# Patient Record
Sex: FEMALE | Race: OTHER | NOT HISPANIC OR LATINO
[De-identification: names, ages, dates, MRNs, and addresses within clinical notes are randomized per-mention and may not be internally consistent; named-entity substitution may affect disease eponyms.]

---

## 2018-02-20 ENCOUNTER — TRANSCRIPTION ENCOUNTER (OUTPATIENT)
Age: 43
End: 2018-02-20

## 2018-02-20 PROBLEM — Z00.00 ENCOUNTER FOR PREVENTIVE HEALTH EXAMINATION: Status: ACTIVE | Noted: 2018-02-20

## 2018-03-06 ENCOUNTER — MOBILE ON CALL (OUTPATIENT)
Age: 43
End: 2018-03-06

## 2018-03-06 ENCOUNTER — LABORATORY RESULT (OUTPATIENT)
Age: 43
End: 2018-03-06

## 2018-03-06 ENCOUNTER — APPOINTMENT (OUTPATIENT)
Dept: DERMATOLOGY | Facility: CLINIC | Age: 43
End: 2018-03-06
Payer: COMMERCIAL

## 2018-03-06 VITALS
BODY MASS INDEX: 32.2 KG/M2 | DIASTOLIC BLOOD PRESSURE: 70 MMHG | HEIGHT: 62 IN | SYSTOLIC BLOOD PRESSURE: 120 MMHG | WEIGHT: 175 LBS

## 2018-03-06 DIAGNOSIS — D48.7 NEOPLASM OF UNCERTAIN BEHAVIOR OF OTHER SPECIFIED SITES: ICD-10-CM

## 2018-03-06 DIAGNOSIS — Z12.83 ENCOUNTER FOR SCREENING FOR MALIGNANT NEOPLASM OF SKIN: ICD-10-CM

## 2018-03-06 DIAGNOSIS — L73.8 OTHER SPECIFIED FOLLICULAR DISORDERS: ICD-10-CM

## 2018-03-06 DIAGNOSIS — L81.4 OTHER MELANIN HYPERPIGMENTATION: ICD-10-CM

## 2018-03-06 DIAGNOSIS — L82.1 OTHER SEBORRHEIC KERATOSIS: ICD-10-CM

## 2018-03-06 DIAGNOSIS — Z56.0 UNEMPLOYMENT, UNSPECIFIED: ICD-10-CM

## 2018-03-06 DIAGNOSIS — Z78.9 OTHER SPECIFIED HEALTH STATUS: ICD-10-CM

## 2018-03-06 DIAGNOSIS — D48.5 NEOPLASM OF UNCERTAIN BEHAVIOR OF SKIN: ICD-10-CM

## 2018-03-06 DIAGNOSIS — Z86.39 PERSONAL HISTORY OF OTHER ENDOCRINE, NUTRITIONAL AND METABOLIC DISEASE: ICD-10-CM

## 2018-03-06 DIAGNOSIS — Z87.898 PERSONAL HISTORY OF OTHER SPECIFIED CONDITIONS: ICD-10-CM

## 2018-03-06 DIAGNOSIS — Z80.8 FAMILY HISTORY OF MALIGNANT NEOPLASM OF OTHER ORGANS OR SYSTEMS: ICD-10-CM

## 2018-03-06 PROCEDURE — 11300 SHAVE SKIN LESION 0.5 CM/<: CPT

## 2018-03-06 PROCEDURE — 99204 OFFICE O/P NEW MOD 45 MIN: CPT | Mod: 25

## 2018-03-06 SDOH — ECONOMIC STABILITY - INCOME SECURITY: UNEMPLOYMENT, UNSPECIFIED: Z56.0

## 2018-04-10 ENCOUNTER — APPOINTMENT (OUTPATIENT)
Dept: PULMONOLOGY | Facility: CLINIC | Age: 43
End: 2018-04-10
Payer: COMMERCIAL

## 2018-04-10 VITALS
OXYGEN SATURATION: 98 % | DIASTOLIC BLOOD PRESSURE: 70 MMHG | HEART RATE: 94 BPM | HEIGHT: 61 IN | SYSTOLIC BLOOD PRESSURE: 110 MMHG | TEMPERATURE: 97.8 F | WEIGHT: 188 LBS | BODY MASS INDEX: 35.5 KG/M2 | RESPIRATION RATE: 16 BRPM

## 2018-04-10 DIAGNOSIS — R06.2 WHEEZING: ICD-10-CM

## 2018-04-10 DIAGNOSIS — J30.89 OTHER ALLERGIC RHINITIS: ICD-10-CM

## 2018-04-10 PROCEDURE — 94726 PLETHYSMOGRAPHY LUNG VOLUMES: CPT

## 2018-04-10 PROCEDURE — 94060 EVALUATION OF WHEEZING: CPT

## 2018-04-10 PROCEDURE — 94729 DIFFUSING CAPACITY: CPT

## 2018-04-10 PROCEDURE — 99205 OFFICE O/P NEW HI 60 MIN: CPT | Mod: 25

## 2018-04-10 PROCEDURE — ZZZZZ: CPT

## 2018-05-09 ENCOUNTER — APPOINTMENT (OUTPATIENT)
Dept: PULMONOLOGY | Facility: CLINIC | Age: 43
End: 2018-05-09
Payer: COMMERCIAL

## 2018-05-09 PROCEDURE — 95070 INHLJ BRNCL CHALLENGE TSTG: CPT

## 2018-05-09 PROCEDURE — 94070 EVALUATION OF WHEEZING: CPT

## 2018-05-15 ENCOUNTER — APPOINTMENT (OUTPATIENT)
Dept: PULMONOLOGY | Facility: CLINIC | Age: 43
End: 2018-05-15

## 2018-08-23 ENCOUNTER — FORM ENCOUNTER (OUTPATIENT)
Age: 43
End: 2018-08-23

## 2018-09-10 ENCOUNTER — APPOINTMENT (OUTPATIENT)
Dept: UROLOGY | Facility: CLINIC | Age: 43
End: 2018-09-10
Payer: COMMERCIAL

## 2018-09-10 VITALS
WEIGHT: 180 LBS | SYSTOLIC BLOOD PRESSURE: 117 MMHG | HEIGHT: 61 IN | HEART RATE: 67 BPM | BODY MASS INDEX: 33.99 KG/M2 | RESPIRATION RATE: 16 BRPM | DIASTOLIC BLOOD PRESSURE: 79 MMHG

## 2018-09-10 DIAGNOSIS — N39.3 STRESS INCONTINENCE (FEMALE) (MALE): ICD-10-CM

## 2018-09-10 PROCEDURE — 99203 OFFICE O/P NEW LOW 30 MIN: CPT

## 2018-11-20 ENCOUNTER — APPOINTMENT (OUTPATIENT)
Dept: PULMONOLOGY | Facility: CLINIC | Age: 43
End: 2018-11-20
Payer: COMMERCIAL

## 2018-11-20 VITALS
HEART RATE: 79 BPM | TEMPERATURE: 97.6 F | SYSTOLIC BLOOD PRESSURE: 121 MMHG | RESPIRATION RATE: 15 BRPM | DIASTOLIC BLOOD PRESSURE: 77 MMHG | OXYGEN SATURATION: 95 % | HEIGHT: 61 IN

## 2018-11-20 DIAGNOSIS — E66.01 MORBID (SEVERE) OBESITY DUE TO EXCESS CALORIES: ICD-10-CM

## 2018-11-20 DIAGNOSIS — K21.9 GASTRO-ESOPHAGEAL REFLUX DISEASE W/OUT ESOPHAGITIS: ICD-10-CM

## 2018-11-20 PROCEDURE — 99214 OFFICE O/P EST MOD 30 MIN: CPT

## 2019-01-22 ENCOUNTER — APPOINTMENT (OUTPATIENT)
Dept: PULMONOLOGY | Facility: CLINIC | Age: 44
End: 2019-01-22
Payer: COMMERCIAL

## 2019-01-22 VITALS
TEMPERATURE: 97.5 F | SYSTOLIC BLOOD PRESSURE: 106 MMHG | DIASTOLIC BLOOD PRESSURE: 71 MMHG | RESPIRATION RATE: 15 BRPM | WEIGHT: 185 LBS | BODY MASS INDEX: 34.04 KG/M2 | OXYGEN SATURATION: 98 % | HEIGHT: 62 IN | HEART RATE: 76 BPM

## 2019-01-22 DIAGNOSIS — R06.02 SHORTNESS OF BREATH: ICD-10-CM

## 2019-01-22 DIAGNOSIS — Z87.09 PERSONAL HISTORY OF OTHER DISEASES OF THE RESPIRATORY SYSTEM: ICD-10-CM

## 2019-01-22 PROCEDURE — 99214 OFFICE O/P EST MOD 30 MIN: CPT

## 2019-01-22 RX ORDER — ALBUTEROL SULFATE 2.5 MG/3ML
(2.5 MG/3ML) SOLUTION RESPIRATORY (INHALATION)
Qty: 60 | Refills: 2 | Status: COMPLETED | COMMUNITY
Start: 2019-01-22 | End: 2019-03-08

## 2019-01-22 NOTE — DISCUSSION/SUMMARY
[FreeTextEntry1] : EMBER: Increased  CPAP 5 to 7 cm H20.\par Chronic Rhinitis. Suggest [facility] sinus rinse 1-2x daily.\par \par Pt does not have asthma. Methacholine challenge test negative.\par \par f/u 3-6 months.

## 2019-01-22 NOTE — HISTORY OF PRESENT ILLNESS
[FreeTextEntry1] : Ms. Proctor comes for f/u for sleep apnea. Her CPAP is set at the wrong setting of 5 cm H20 instead of & cm H20 and she notices the difference.She says she is not sleeping well.\par \par No difficulty breathing. Stopped Flonase and Claritin because made her nose too dry.

## 2019-01-22 NOTE — REVIEW OF SYSTEMS
[Nasal Congestion] : nasal congestion [Postnasal Drip] : postnasal drip [As Noted in HPI] : as noted in HPI [Negative] : Neurologic

## 2019-01-22 NOTE — PHYSICAL EXAM
[Normal Conjunctiva] : the conjunctiva exhibited no abnormalities [Eyelids - No Xanthelasma] : the eyelids demonstrated no xanthelasmas [Normal Oropharynx] : abnormal oropharynx [Low Lying Soft Palate] : low lying soft palate [Elongated Uvula] : no elongated uvula [Enlarged Base of the Tongue] : no enlargement of the base of the tongue [Erythema] : no erythema of the pharynx [Retrognathia] : no retrognathia [Micrognathia] : no micrognathia [III] : III [Neck Appearance] : the appearance of the neck was normal [Neck Cervical Mass (___cm)] : no neck mass was observed [Jugular Venous Distention Increased] : there was no jugular-venous distention [Thyroid Diffuse Enlargement] : the thyroid was not enlarged [Thyroid Nodule] : there were no palpable thyroid nodules [Heart Rate And Rhythm] : heart rate and rhythm were normal [Heart Sounds] : normal S1 and S2 [Murmurs] : no murmurs present [Respiration, Rhythm And Depth] : normal respiratory rhythm and effort [Exaggerated Use Of Accessory Muscles For Inspiration] : no accessory muscle use [Auscultation Breath Sounds / Voice Sounds] : lungs were clear to auscultation bilaterally [Abdomen Soft] : soft [Abdomen Tenderness] : non-tender [Abdomen Mass (___ Cm)] : no abdominal mass palpated [FreeTextEntry1] : obese [Abnormal Walk] : normal gait [Gait - Sufficient For Exercise Testing] : the gait was sufficient for exercise testing [Nail Clubbing] : no clubbing of the fingernails [Cyanosis, Localized] : no localized cyanosis [Petechial Hemorrhages (___cm)] : no petechial hemorrhages [Skin Color & Pigmentation] : normal skin color and pigmentation [] : no rash [No Venous Stasis] : no venous stasis [Skin Lesions] : no skin lesions [No Skin Ulcers] : no skin ulcer [No Xanthoma] : no  xanthoma was observed [No Focal Deficits] : no focal deficits [Oriented To Time, Place, And Person] : oriented to person, place, and time [Impaired Insight] : insight and judgment were intact [Affect] : the affect was normal

## 2019-03-12 ENCOUNTER — APPOINTMENT (OUTPATIENT)
Dept: PULMONOLOGY | Facility: CLINIC | Age: 44
End: 2019-03-12
Payer: COMMERCIAL

## 2019-03-12 VITALS
SYSTOLIC BLOOD PRESSURE: 105 MMHG | RESPIRATION RATE: 16 BRPM | HEART RATE: 77 BPM | TEMPERATURE: 97.7 F | OXYGEN SATURATION: 98 % | DIASTOLIC BLOOD PRESSURE: 70 MMHG

## 2019-03-12 DIAGNOSIS — Z99.89 OBSTRUCTIVE SLEEP APNEA (ADULT) (PEDIATRIC): ICD-10-CM

## 2019-03-12 DIAGNOSIS — G47.33 OBSTRUCTIVE SLEEP APNEA (ADULT) (PEDIATRIC): ICD-10-CM

## 2019-03-12 DIAGNOSIS — J31.0 CHRONIC RHINITIS: ICD-10-CM

## 2019-03-12 PROCEDURE — 99213 OFFICE O/P EST LOW 20 MIN: CPT

## 2019-03-12 NOTE — PHYSICAL EXAM
[General Appearance - Well Developed] : well developed [Normal Appearance] : normal appearance [Well Groomed] : well groomed [General Appearance - Well Nourished] : well nourished [No Deformities] : no deformities [General Appearance - In No Acute Distress] : no acute distress [Normal Conjunctiva] : the conjunctiva exhibited no abnormalities [Eyelids - No Xanthelasma] : the eyelids demonstrated no xanthelasmas [Normal Oropharynx] : normal oropharynx [Neck Appearance] : the appearance of the neck was normal [Neck Cervical Mass (___cm)] : no neck mass was observed [Jugular Venous Distention Increased] : there was no jugular-venous distention [Thyroid Diffuse Enlargement] : the thyroid was not enlarged [Thyroid Nodule] : there were no palpable thyroid nodules [Heart Rate And Rhythm] : heart rate and rhythm were normal [Heart Sounds] : normal S1 and S2 [Murmurs] : no murmurs present [Respiration, Rhythm And Depth] : normal respiratory rhythm and effort [Exaggerated Use Of Accessory Muscles For Inspiration] : no accessory muscle use [Auscultation Breath Sounds / Voice Sounds] : lungs were clear to auscultation bilaterally [Abdomen Soft] : soft [Abdomen Tenderness] : non-tender [Abdomen Mass (___ Cm)] : no abdominal mass palpated [Abnormal Walk] : normal gait [Gait - Sufficient For Exercise Testing] : the gait was sufficient for exercise testing [Nail Clubbing] : no clubbing of the fingernails [Cyanosis, Localized] : no localized cyanosis [Petechial Hemorrhages (___cm)] : no petechial hemorrhages [Skin Color & Pigmentation] : normal skin color and pigmentation [Skin Turgor] : normal skin turgor [] : no rash [No Focal Deficits] : no focal deficits [Oriented To Time, Place, And Person] : oriented to person, place, and time [Impaired Insight] : insight and judgment were intact [Affect] : the affect was normal [FreeTextEntry1] : overweight female in NAD

## 2019-03-12 NOTE — HISTORY OF PRESENT ILLNESS
[FreeTextEntry1] : Ms. Proctor is here for f/u for sleep apnea. She is sleeping better with CPAP of 7 cm H20.\par However she says  the straps for her mask hurt her ears. In addition she broke her the main hose of the machine.\par \par She continues to have post nasal drip. She has not used the [facility] sinus rinse. She has not used  her Flonase recently because of nose bleed.

## 2019-03-12 NOTE — DISCUSSION/SUMMARY
[FreeTextEntry1] : 1. OSAS. Continue CPAP 7cm H20.  Orders  given for new hose and strap fitting for mask.\par 2. Chronic Rhinitis. Flonase 1 squirt every other day. Encourage to try sinus rinse.

## 2019-03-12 NOTE — REVIEW OF SYSTEMS
[Nasal Congestion] : nasal congestion [Postnasal Drip] : postnasal drip [As Noted in HPI] : as noted in HPI [Negative] : Pulmonary Hypertension

## 2019-08-14 ENCOUNTER — APPOINTMENT (OUTPATIENT)
Dept: ORTHOPEDIC SURGERY | Facility: CLINIC | Age: 44
End: 2019-08-14
Payer: MEDICARE

## 2019-08-14 VITALS
BODY MASS INDEX: 33.13 KG/M2 | SYSTOLIC BLOOD PRESSURE: 104 MMHG | HEART RATE: 80 BPM | WEIGHT: 180 LBS | HEIGHT: 62 IN | DIASTOLIC BLOOD PRESSURE: 72 MMHG

## 2019-08-14 DIAGNOSIS — Z82.61 FAMILY HISTORY OF ARTHRITIS: ICD-10-CM

## 2019-08-14 DIAGNOSIS — M65.331 TRIGGER FINGER, RIGHT MIDDLE FINGER: ICD-10-CM

## 2019-08-14 DIAGNOSIS — Z78.9 OTHER SPECIFIED HEALTH STATUS: ICD-10-CM

## 2019-08-14 DIAGNOSIS — Z80.9 FAMILY HISTORY OF MALIGNANT NEOPLASM, UNSPECIFIED: ICD-10-CM

## 2019-08-14 DIAGNOSIS — M65.841 OTHER SYNOVITIS AND TENOSYNOVITIS, RIGHT HAND: ICD-10-CM

## 2019-08-14 PROCEDURE — 99203 OFFICE O/P NEW LOW 30 MIN: CPT | Mod: 25

## 2019-08-14 PROCEDURE — 20551 NJX 1 TENDON ORIGIN/INSJ: CPT | Mod: LT

## 2019-08-14 PROCEDURE — 20550 NJX 1 TENDON SHEATH/LIGAMENT: CPT | Mod: F7

## 2019-08-17 RX ORDER — ROSUVASTATIN CALCIUM 5 MG/1
TABLET, FILM COATED ORAL
Refills: 0 | Status: ACTIVE | COMMUNITY

## 2019-08-17 RX ORDER — PANTOPRAZOLE SODIUM 40 MG/1
40 GRANULE, DELAYED RELEASE ORAL
Refills: 0 | Status: ACTIVE | COMMUNITY

## 2019-08-17 RX ORDER — LEVOTHYROXINE SODIUM 0.17 MG/1
TABLET ORAL
Refills: 0 | Status: ACTIVE | COMMUNITY

## 2019-09-10 ENCOUNTER — APPOINTMENT (OUTPATIENT)
Dept: HUMAN REPRODUCTION | Facility: CLINIC | Age: 44
End: 2019-09-10

## 2020-07-05 ENCOUNTER — TRANSCRIPTION ENCOUNTER (OUTPATIENT)
Age: 45
End: 2020-07-05

## 2020-10-21 ENCOUNTER — APPOINTMENT (OUTPATIENT)
Dept: ORTHOPEDIC SURGERY | Facility: CLINIC | Age: 45
End: 2020-10-21
Payer: MEDICARE

## 2020-10-21 VITALS — WEIGHT: 180 LBS | HEIGHT: 62 IN | BODY MASS INDEX: 33.13 KG/M2

## 2020-10-21 DIAGNOSIS — S83.232A COMPLEX TEAR OF MEDIAL MENISCUS, CURRENT INJURY, LEFT KNEE, INITIAL ENCOUNTER: ICD-10-CM

## 2020-10-21 DIAGNOSIS — M23.91 UNSPECIFIED INTERNAL DERANGEMENT OF RIGHT KNEE: ICD-10-CM

## 2020-10-21 DIAGNOSIS — M25.562 PAIN IN LEFT KNEE: ICD-10-CM

## 2020-10-21 DIAGNOSIS — M17.11 UNILATERAL PRIMARY OSTEOARTHRITIS, RIGHT KNEE: ICD-10-CM

## 2020-10-21 PROCEDURE — 99072 ADDL SUPL MATRL&STAF TM PHE: CPT

## 2020-10-21 PROCEDURE — 99204 OFFICE O/P NEW MOD 45 MIN: CPT

## 2020-10-21 NOTE — CONSULT LETTER
[Dear  ___] : Dear  [unfilled], [Consult Letter:] : I had the pleasure of evaluating your patient, [unfilled]. [Please see my note below.] : Please see my note below. [Consult Closing:] : Thank you very much for allowing me to participate in the care of this patient.  If you have any questions, please do not hesitate to contact me. [Sincerely,] : Sincerely, [FreeTextEntry2] : Katia Alegria [FreeTextEntry3] : Scot Hernandez MD\par

## 2020-10-21 NOTE — DISCUSSION/SUMMARY
[de-identified] : Impression;\par -Rule out tear medial meniscus left knee\par -Osteoarthritis medial compartment left knee\par Plan;\par -Given young age MRI to stage extent of disease medial compartment

## 2020-10-21 NOTE — PHYSICAL EXAM
[de-identified] : Constitutional:Well nourished , well developed and in no acute distress\par Psychiatric: Alert and oriented to time place and person.Appropriate affect\par Respiratory: Unlabored respirations,no audible wheezing\par Cardiovascular: no leg swelling  ankle edema\par Vascular: no calf or thigh tenderness, \par Peripheral pulses; intact\par Skin:Head, neck, arms and lower extremities:no lesions or discoloration\par Lymphatics:No groin adenopathy\par Neurological: intact light touch sensation and grossly intact coordination and motor power.\par Knee; left knee; no effusion flexion 0/1:30 ligaments intact tenderness medial joint line [de-identified] : X-rays left knee of October 13, 2020 8 PM lateral demonstrate medial compartment marginal osteophytes small subchondral cyst

## 2020-10-21 NOTE — HISTORY OF PRESENT ILLNESS
[de-identified] : 44-year-old female disabled presents complaining of pain left knee. A few years ago developed spontaneous onset of intermittent left knee medial parapatellar pain. Recently symptoms have been increasing in severity pain provoked by going from sitting to standing and ambulation. Sensation described as "twisting" with duration of pain from minutes to hours. Denies swelling locking or giving out. Past past surgical history of multiple lumbar spine surgeries and a MRI compatible spinal cord stimulator

## 2020-11-02 ENCOUNTER — OUTPATIENT (OUTPATIENT)
Dept: OUTPATIENT SERVICES | Facility: HOSPITAL | Age: 45
LOS: 1 days | End: 2020-11-02
Payer: COMMERCIAL

## 2020-11-02 ENCOUNTER — APPOINTMENT (OUTPATIENT)
Dept: MRI IMAGING | Facility: CLINIC | Age: 45
End: 2020-11-02
Payer: MEDICARE

## 2020-11-02 DIAGNOSIS — M25.562 PAIN IN LEFT KNEE: ICD-10-CM

## 2020-11-02 PROCEDURE — 73721 MRI JNT OF LWR EXTRE W/O DYE: CPT | Mod: 26,LT

## 2020-11-02 PROCEDURE — 73721 MRI JNT OF LWR EXTRE W/O DYE: CPT

## 2020-11-04 ENCOUNTER — APPOINTMENT (OUTPATIENT)
Dept: ORTHOPEDIC SURGERY | Facility: CLINIC | Age: 45
End: 2020-11-04
Payer: MEDICARE

## 2020-11-04 DIAGNOSIS — M17.12 UNILATERAL PRIMARY OSTEOARTHRITIS, LEFT KNEE: ICD-10-CM

## 2020-11-04 PROCEDURE — 99072 ADDL SUPL MATRL&STAF TM PHE: CPT

## 2020-11-04 PROCEDURE — 99213 OFFICE O/P EST LOW 20 MIN: CPT

## 2020-11-04 NOTE — HISTORY OF PRESENT ILLNESS
[de-identified] : Followup pain left knee. A few years ago developed spontaneous onset of intermittent left knee medial parapatellar pain. Recently symptoms have been increasing in severity pain provoked by going from sitting to standing and ambulation. Sensation described as "twisting" with duration of pain from minutes to hours. Denies swelling locking or giving out. Past past surgical history of multiple lumbar spine surgeries and a MRI compatible spinal cord stimulator. Patient is here today for MRI review of the left knee.

## 2020-11-04 NOTE — PHYSICAL EXAM
[de-identified] : Constitutional:Well nourished , well developed and in no acute distress\par Psychiatric: Alert and oriented to time place and person.Appropriate affect\par Respiratory: Unlabored respirations,no audible wheezing\par Cardiovascular: no leg swelling  ankle edema\par Vascular: no calf or thigh tenderness, \par Peripheral pulses; intact\par Skin:Head, neck, arms and lower extremities:no lesions or discoloration\par Lymphatics:No groin adenopathy\par Neurological: intact light touch sensation and grossly intact coordination and motor power.\par Knee; left knee; no effusion flexion 0/1:30 ligaments intact tenderness medial joint line [de-identified] : o MRI of the left knee performed on 11/02/2020 at [facility]: impression: \par ¦ Focal areas of high-grade cartilage loss at the medial lateral femoral condyles.\par ¦ Mild chondral wear at the central patella.

## 2020-11-04 NOTE — DISCUSSION/SUMMARY
[de-identified] : MRI of the left knee was reviewed and discussed in great detail today. \par \par A prescription for Physical Therapy was provided.\par \par  A referral was provided for Dr Aceves for evaluation and possible viscosupplementation. Advised patient to follow up with her in 1 month.

## 2020-12-08 ENCOUNTER — APPOINTMENT (OUTPATIENT)
Dept: ORTHOPEDIC SURGERY | Facility: CLINIC | Age: 45
End: 2020-12-08
Payer: MEDICARE

## 2020-12-08 DIAGNOSIS — M17.12 UNILATERAL PRIMARY OSTEOARTHRITIS, LEFT KNEE: ICD-10-CM

## 2020-12-08 PROCEDURE — 99204 OFFICE O/P NEW MOD 45 MIN: CPT

## 2020-12-08 PROCEDURE — 99072 ADDL SUPL MATRL&STAF TM PHE: CPT

## 2020-12-08 RX ORDER — HYALURONATE SODIUM, STABILIZED 88 MG/4 ML
88 SYRINGE (ML) INTRAARTICULAR
Qty: 1 | Refills: 0 | Status: ACTIVE | COMMUNITY
Start: 2020-12-08

## 2020-12-12 PROBLEM — M17.12 ARTHRITIS OF KNEE, LEFT: Status: ACTIVE | Noted: 2020-11-04

## 2021-05-05 ENCOUNTER — RESULT REVIEW (OUTPATIENT)
Age: 46
End: 2021-05-05

## 2021-08-19 ENCOUNTER — TRANSCRIPTION ENCOUNTER (OUTPATIENT)
Age: 46
End: 2021-08-19

## 2022-01-12 ENCOUNTER — APPOINTMENT (OUTPATIENT)
Dept: PULMONOLOGY | Facility: CLINIC | Age: 47
End: 2022-01-12
Payer: MEDICARE

## 2022-01-12 DIAGNOSIS — E61.1 IRON DEFICIENCY: ICD-10-CM

## 2022-01-12 PROCEDURE — 99204 OFFICE O/P NEW MOD 45 MIN: CPT | Mod: 95

## 2022-01-12 RX ORDER — PANTOPRAZOLE SODIUM 40 MG/1
GRANULE, DELAYED RELEASE ORAL
Refills: 0 | Status: DISCONTINUED | COMMUNITY
End: 2022-01-12

## 2022-01-12 RX ORDER — LEVOTHYROXINE SODIUM 137 UG/1
TABLET ORAL
Refills: 0 | Status: DISCONTINUED | COMMUNITY
End: 2022-01-12

## 2022-01-12 RX ORDER — SEMAGLUTIDE 1.34 MG/ML
2 INJECTION, SOLUTION SUBCUTANEOUS
Refills: 0 | Status: ACTIVE | COMMUNITY

## 2022-01-12 RX ORDER — GLIMEPIRIDE 4 MG/1
TABLET ORAL
Refills: 0 | Status: DISCONTINUED | COMMUNITY
End: 2022-01-12

## 2022-01-12 RX ORDER — SITAGLIPTIN 100 MG/1
TABLET, FILM COATED ORAL
Refills: 0 | Status: DISCONTINUED | COMMUNITY
End: 2022-01-12

## 2022-01-12 NOTE — REVIEW OF SYSTEMS
[EDS: ESS=____] : daytime somnolence: ESS=[unfilled] [Lower Extremity Discomfort] : lower extremity discomfort [Negative] : Psychiatric

## 2022-01-12 NOTE — HISTORY OF PRESENT ILLNESS
[Obstructive Sleep Apnea] : obstructive sleep apnea [Snoring] : snoring [Frequent Nocturnal Awakening] : frequent nocturnal awakening [Unintentional Sleep While Inactive] : unintentional sleep while inactive [Awakes Unrefreshed] : awakening unrefreshed [Awakes with Headache] : headache upon awakening [Awakening With Dry Mouth] : awakening with dry mouth [Lower Extremity Discomfort] : lower extremity discomfort in evening or at bedtime [To Bed: ___] : ~he/she~ goes to bed at [unfilled] [Arises: ___] : arises at [unfilled] [Sleep Onset Latency: ___ minutes] : sleep onset latency of [unfilled] minutes reported [Nocturnal Awakenings: ___] : ~he/she~ typically has [unfilled] nocturnal awakenings [TST: ___] : Total sleep time is [unfilled] [Home] : at home, [unfilled] , at the time of the visit. [Medical Office: ([facility])___] : at the medical office located in  [Verbal consent obtained from patient] : the patient, [unfilled] [FreeTextEntry1] : 46 year old female with with moderate sleep apnea previously on CPAP here to re-establish care.\par \par PSG (1/14/2016) AHI 19.9. CPAP titration (2/3/2016) showed an optimal pressure of 7 cm H2O. Latest CPAP titration (8/24/2018) showed an optimal pressure of 5 cm H2O but settings most recently adjusted back up to 7 cm H2O. She has a Dreamstation 1 - setup 1/7/19). She halted therapy as she has been unable to get supplies since April 2021 and recently found out from her [employer] that she needs an updated script for her supplies.She has loud snoring and frequent night time awakenings with daytime sleepiness (ESS - 13), which were the symptoms that led to her PSG in 2016. Most recent compliance report available from 2019 shows normalized AHI of 1.9 on CPAP of 7 cm H2O. Notably she also has uncomfortable sensation in her legs that prevent her from sleeping at night. This is relieved by walking.\par \par Co-morbidities: DM, GERD, Hypothyroid\par \par EPWORTH SLEEPINESS SCALE\par \par Chance of dozing.............Situation\par .....................2...................Sitting and reading\par .....................3...................Watching TV\par .....................0...................Sitting inactive in a public place (eg a theatre or a meeting)\par .....................3...................As a passenger in a car for an hour without a break\par .....................3...................Lying down to rest in the afternoon when circumstances permit\par .....................0...................Sitting and talking to someone\par .....................2...................Sitting quietly after lunch without alcohol\par .....................0...................In a car, while stopped for a few minutes in traffic\par \par .....................13...................TOTAL SCORE\par \par 0 = Never would doze\par 1 = Slight chance of dozing\par 2 = Moderate chance of dozing\par 3 = High chance of dozing\par  [Witnessed Apneas] : no witnessed sleep apnea [Unintentional Sleep while Active] : no unintentional sleep while active [Recent  Weight Gain] : no recent weight gain [Unusual Sleep Behavior] : no unusual sleep behavior [Sleep Paralysis] : no sleep paralysis [ESS] : 13

## 2022-01-12 NOTE — ASSESSMENT
[FreeTextEntry1] : 45yo F with moderate sleep apnea (AHI 19.9 in 2016) on CPAP (Dream Station - setup 1/7/19) presenting to resume care for her EMBER\par \par 1) EMBER - Moderate severity as of 2016 with an AHI of 19.9. Subsequent CPAP titration showed normalized AHI to 1.9 with a CPAP of 7cm H2O, which is also reflected by her most recent therapy data. We will update her script and request new supplies from her [employer] company. We have also discussed the [employer] recall for her DreamStation and urged her to register her device on the [employer] website. Given her symptomatic relief from CPAP use, she benefits from its use and should continue with her therapy once she receives new supplies. The ramifications of obstructive sleep apnea and its potential therapeutic modalities were discussed with the patient. The dangers of drowsy driving were discussed with the patient. The patient was warned to avoid drowsy driving.\par \par 2) RLS - Patient has uncomfortable sensations in her legs prior to sleeping at night, which is relieved with walking and prevents her from falling asleep at night. This is consistent with RLS. While she has no history of anemia, we will check her iron levels, Iron sat, and ferritin to see if she would potentially benefit from iron supplementation. We will follow up with her once these lab results are available.

## 2022-01-28 ENCOUNTER — NON-APPOINTMENT (OUTPATIENT)
Age: 47
End: 2022-01-28

## 2022-01-28 LAB
FERRITIN SERPL-MCNC: 95 NG/ML
IRON SATN MFR SERPL: 36 %
IRON SERPL-MCNC: 63 UG/DL
TIBC SERPL-MCNC: 172 UG/DL
TRANSFERRIN SERPL-MCNC: 141 MG/DL
UIBC SERPL-MCNC: 109 UG/DL

## 2022-04-25 ENCOUNTER — APPOINTMENT (OUTPATIENT)
Dept: ORTHOPEDIC SURGERY | Facility: CLINIC | Age: 47
End: 2022-04-25

## 2022-04-26 ENCOUNTER — APPOINTMENT (OUTPATIENT)
Dept: ORTHOPEDIC SURGERY | Facility: CLINIC | Age: 47
End: 2022-04-26
Payer: MEDICARE

## 2022-04-26 VITALS — WEIGHT: 170 LBS | HEIGHT: 62 IN | BODY MASS INDEX: 31.28 KG/M2

## 2022-04-26 DIAGNOSIS — M77.12 LATERAL EPICONDYLITIS, LEFT ELBOW: ICD-10-CM

## 2022-04-26 PROCEDURE — 99213 OFFICE O/P EST LOW 20 MIN: CPT

## 2022-04-26 NOTE — HISTORY OF PRESENT ILLNESS
[10] : 10 [5] : 5 [Dull/Aching] : dull/aching [de-identified] : MRI shows atrophy at the anconeus; mild inflammation  [FreeTextEntry1] : left elbow [de-identified] : massage

## 2022-05-09 ENCOUNTER — APPOINTMENT (OUTPATIENT)
Dept: PULMONOLOGY | Facility: CLINIC | Age: 47
End: 2022-05-09
Payer: MEDICARE

## 2022-05-09 DIAGNOSIS — G25.81 RESTLESS LEGS SYNDROME: ICD-10-CM

## 2022-05-09 PROCEDURE — 99213 OFFICE O/P EST LOW 20 MIN: CPT | Mod: 95

## 2022-05-09 NOTE — HISTORY OF PRESENT ILLNESS
[Home] : at home, [unfilled] , at the time of the visit. [Medical Office: ([facility])___] : at the medical office located in  [Verbal consent obtained from patient] : the patient, [unfilled] [FreeTextEntry1] : 46 year old female with with moderate sleep apnea here for follow-up visit.\par \par Comorbidities include diabetes, GERD, hypothyroidism.  \par \par PSG (1/14/2016) AHI 19.9. CPAP titration (2/3/2016) showed an optimal pressure of 7 cm H2O. Latest CPAP titration (8/24/2018) showed an optimal pressure of 5 cm H2O but settings most recently adjusted back up to 7 cm H2O. She has a Dreamstation 1 - setup 1/7/19). She halted therapy as she has been unable to get supplies since April 2021.  After establishing care with me back in January, new prescription for supplies was ordered but she still has not received.  She is unsure if she registered her CPAP but believes she has.  RLS symptoms are overall mild and she feels she does not require treatment.\par

## 2022-05-09 NOTE — ASSESSMENT
[FreeTextEntry1] : 47 yo F with moderate sleep apnea on CPAP (Dream Station - setup 1/7/19) here for follow-up visit.\par \par She has been unable to resume therapy because supplies have not been received to date.  [facility] and have them send supplies.  She was advised to check the [facility] website to see if her DreamStation has been registered.  We will hold off on treatment for RLS as symptoms are overall mild. \par  Follow-up in 3 months for data download.\par \par

## 2022-05-18 ENCOUNTER — APPOINTMENT (OUTPATIENT)
Dept: NEUROLOGY | Facility: CLINIC | Age: 47
End: 2022-05-18
Payer: MEDICARE

## 2022-05-18 DIAGNOSIS — M79.632 PAIN IN LEFT FOREARM: ICD-10-CM

## 2022-05-18 DIAGNOSIS — R20.2 ANESTHESIA OF SKIN: ICD-10-CM

## 2022-05-18 DIAGNOSIS — G56.02 CARPAL TUNNEL SYNDROME, LEFT UPPER LIMB: ICD-10-CM

## 2022-05-18 DIAGNOSIS — R20.0 ANESTHESIA OF SKIN: ICD-10-CM

## 2022-05-18 DIAGNOSIS — M54.12 RADICULOPATHY, CERVICAL REGION: ICD-10-CM

## 2022-05-18 DIAGNOSIS — G56.22 LESION OF ULNAR NERVE, LEFT UPPER LIMB: ICD-10-CM

## 2022-05-18 PROCEDURE — 95911 NRV CNDJ TEST 9-10 STUDIES: CPT

## 2022-05-18 PROCEDURE — 95886 MUSC TEST DONE W/N TEST COMP: CPT

## 2022-05-24 ENCOUNTER — APPOINTMENT (OUTPATIENT)
Dept: ORTHOPEDIC SURGERY | Facility: CLINIC | Age: 47
End: 2022-05-24
Payer: MEDICARE

## 2022-05-24 VITALS — WEIGHT: 170 LBS | BODY MASS INDEX: 31.28 KG/M2 | HEIGHT: 62 IN

## 2022-05-24 DIAGNOSIS — M54.2 CERVICALGIA: ICD-10-CM

## 2022-05-24 DIAGNOSIS — M54.12 RADICULOPATHY, CERVICAL REGION: ICD-10-CM

## 2022-05-24 PROCEDURE — 99213 OFFICE O/P EST LOW 20 MIN: CPT

## 2022-05-24 NOTE — HISTORY OF PRESENT ILLNESS
[10] : 10 [5] : 5 [Dull/Aching] : dull/aching [Sharp] : sharp [Tingling] : tingling [de-identified] : EMG shows cervical radic [FreeTextEntry1] : left Hand  [FreeTextEntry5] : pain is the same since last visit  [de-identified] : NO

## 2022-08-10 ENCOUNTER — APPOINTMENT (OUTPATIENT)
Dept: PULMONOLOGY | Facility: CLINIC | Age: 47
End: 2022-08-10

## 2022-08-10 PROCEDURE — 99213 OFFICE O/P EST LOW 20 MIN: CPT | Mod: 95

## 2022-08-10 NOTE — HISTORY OF PRESENT ILLNESS
[FreeTextEntry1] : 46 year old female with with moderate sleep apnea here for follow-up visit.\par \par Comorbidities include diabetes, GERD, hypothyroidism.  \par \par PSG (1/14/2016) AHI 19.9. \par CPAP titration (2/3/2016) showed an optimal pressure of 7 cm H2O. \par Latest CPAP titration (8/24/2018) showed an optimal pressure of 5 cm H2O but settings most recently adjusted back up to 7 cm H2O. \par \par Device: Dreamstation 1 (setup 1/7/19). She halted therapy as she has been unable to get supplies since April 2021.  Established care with me January 2022. New supplies finally received last week. She has resumed CPAP use but feels that the pressure is insufficient. Device is registered through [employer].  [Home] : at home, [unfilled] , at the time of the visit. [Medical Office: ([facility])___] : at the medical office located in  [Verbal consent obtained from patient] : the patient, [unfilled]

## 2022-08-10 NOTE — ASSESSMENT
[FreeTextEntry1] : 46 year old female with moderate sleep apnea on CPAP here for follow-up visit.\par She has resumed therapy but feels pressure is insufficient. Data is not available on Care .\par Contacted [facility] to have modem reset. \par \par Follow-up in 2-3 months for data download.\par \par

## 2022-10-18 ENCOUNTER — APPOINTMENT (OUTPATIENT)
Dept: PULMONOLOGY | Facility: CLINIC | Age: 47
End: 2022-10-18

## 2022-10-18 DIAGNOSIS — G47.33 OBSTRUCTIVE SLEEP APNEA (ADULT) (PEDIATRIC): ICD-10-CM

## 2022-10-18 PROCEDURE — 99214 OFFICE O/P EST MOD 30 MIN: CPT | Mod: 95

## 2022-10-18 RX ORDER — FLUTICASONE PROPIONATE AND SALMETEROL 100; 50 UG/1; UG/1
100-50 POWDER RESPIRATORY (INHALATION) TWICE DAILY
Qty: 1 | Refills: 0 | Status: ACTIVE | COMMUNITY
Start: 2022-10-18 | End: 1900-01-01

## 2022-10-18 NOTE — ASSESSMENT
[FreeTextEntry1] : 46 year old female with moderate sleep apnea on CPAP here for follow-up visit.\par Therapeutic and compliance data download reveals usage 83.3% of days with an average use of 5 hours and 55 minutes. Therapy based AHI is 1.9/hr on 7 cm H2O. The patient is experiencing benefit from CPAP and should continue to use. Will increase pressure to 9 cm H2O as she reports insufficient pressures. \par Rx for Advair will be sent to pharmacy. Will repeat PFT next visit.\par \par \par She will follow up in 3 months for in-person visit and PFTs.\par \par  \par \par

## 2022-10-18 NOTE — HISTORY OF PRESENT ILLNESS
[Home] : at home, [unfilled] , at the time of the visit. [Medical Office: ([facility])___] : at the medical office located in  [Verbal consent obtained from patient] : the patient, [unfilled] [FreeTextEntry1] : 46 year old female with with moderate sleep apnea here for follow-up visit.\par \par Comorbidities include diabetes, GERD, hypothyroidism.  \par \par PSG (1/14/2016) AHI 19.9. \par CPAP titration (2/3/2016) showed an optimal pressure of 7 cm H2O. \par Latest CPAP titration (8/24/2018) showed an optimal pressure of 5 cm H2O but settings most recently adjusted back up to 7 cm H2O. \par \par DME: [facility]\par Device: Dreamstation 1 (setup 1/7/19)\par She halted therapy as she has been unable to get supplies since April 2021.  \par Established care with me January 2022. \par \par Received Dreamstation 2 (9/2022)\par Continues to feel that pressure is insufficient. Uses it nightly but malfunctioned for a few nights so could not use. \par \par She also reports requiring more Ventolin and requests Advair.

## 2023-04-17 ENCOUNTER — APPOINTMENT (OUTPATIENT)
Dept: UROLOGY | Facility: CLINIC | Age: 48
End: 2023-04-17

## 2023-04-19 ENCOUNTER — APPOINTMENT (OUTPATIENT)
Dept: PULMONOLOGY | Facility: CLINIC | Age: 48
End: 2023-04-19

## 2023-07-10 ENCOUNTER — APPOINTMENT (OUTPATIENT)
Dept: ORTHOPEDIC SURGERY | Facility: CLINIC | Age: 48
End: 2023-07-10

## 2024-12-13 ENCOUNTER — APPOINTMENT (OUTPATIENT)
Dept: PHARMACY | Facility: CLINIC | Age: 49
End: 2024-12-13

## 2025-02-10 ENCOUNTER — APPOINTMENT (OUTPATIENT)
Dept: PHARMACY | Facility: CLINIC | Age: 50
End: 2025-02-10
Payer: SELF-PAY

## 2025-02-10 PROCEDURE — V5010 ASSESSMENT FOR HEARING AID: CPT | Mod: NC
